# Patient Record
Sex: MALE | Race: WHITE | NOT HISPANIC OR LATINO | URBAN - METROPOLITAN AREA
[De-identification: names, ages, dates, MRNs, and addresses within clinical notes are randomized per-mention and may not be internally consistent; named-entity substitution may affect disease eponyms.]

---

## 2019-10-28 ENCOUNTER — EMERGENCY (EMERGENCY)
Facility: HOSPITAL | Age: 54
LOS: 1 days | Discharge: HOME | End: 2019-10-28
Attending: EMERGENCY MEDICINE | Admitting: EMERGENCY MEDICINE
Payer: COMMERCIAL

## 2019-10-28 VITALS
HEART RATE: 58 BPM | SYSTOLIC BLOOD PRESSURE: 158 MMHG | OXYGEN SATURATION: 98 % | DIASTOLIC BLOOD PRESSURE: 95 MMHG | TEMPERATURE: 98 F | RESPIRATION RATE: 15 BRPM

## 2019-10-28 VITALS
HEART RATE: 64 BPM | WEIGHT: 212.97 LBS | SYSTOLIC BLOOD PRESSURE: 197 MMHG | RESPIRATION RATE: 18 BRPM | DIASTOLIC BLOOD PRESSURE: 110 MMHG | OXYGEN SATURATION: 98 %

## 2019-10-28 LAB
ALBUMIN SERPL ELPH-MCNC: 3.9 G/DL — SIGNIFICANT CHANGE UP (ref 3.3–5)
ALP SERPL-CCNC: 111 U/L — SIGNIFICANT CHANGE UP (ref 40–120)
ALT FLD-CCNC: 131 U/L — HIGH (ref 12–78)
ANION GAP SERPL CALC-SCNC: 6 MMOL/L — SIGNIFICANT CHANGE UP (ref 5–17)
APPEARANCE UR: CLEAR — SIGNIFICANT CHANGE UP
AST SERPL-CCNC: 62 U/L — HIGH (ref 15–37)
BASOPHILS # BLD AUTO: 0.05 K/UL — SIGNIFICANT CHANGE UP (ref 0–0.2)
BASOPHILS NFR BLD AUTO: 0.8 % — SIGNIFICANT CHANGE UP (ref 0–2)
BILIRUB SERPL-MCNC: 0.7 MG/DL — SIGNIFICANT CHANGE UP (ref 0.2–1.2)
BILIRUB UR-MCNC: NEGATIVE — SIGNIFICANT CHANGE UP
BUN SERPL-MCNC: 12 MG/DL — SIGNIFICANT CHANGE UP (ref 7–23)
CALCIUM SERPL-MCNC: 9.3 MG/DL — SIGNIFICANT CHANGE UP (ref 8.5–10.1)
CHLORIDE SERPL-SCNC: 106 MMOL/L — SIGNIFICANT CHANGE UP (ref 96–108)
CO2 SERPL-SCNC: 27 MMOL/L — SIGNIFICANT CHANGE UP (ref 22–31)
COLOR SPEC: SIGNIFICANT CHANGE UP
CREAT SERPL-MCNC: 1.3 MG/DL — SIGNIFICANT CHANGE UP (ref 0.5–1.3)
DIFF PNL FLD: NEGATIVE — SIGNIFICANT CHANGE UP
EOSINOPHIL # BLD AUTO: 0.14 K/UL — SIGNIFICANT CHANGE UP (ref 0–0.5)
EOSINOPHIL NFR BLD AUTO: 2.1 % — SIGNIFICANT CHANGE UP (ref 0–6)
GLUCOSE SERPL-MCNC: 178 MG/DL — HIGH (ref 70–99)
GLUCOSE UR QL: NEGATIVE — SIGNIFICANT CHANGE UP
HCT VFR BLD CALC: 44.7 % — SIGNIFICANT CHANGE UP (ref 39–50)
HGB BLD-MCNC: 15.8 G/DL — SIGNIFICANT CHANGE UP (ref 13–17)
IMM GRANULOCYTES NFR BLD AUTO: 0.8 % — SIGNIFICANT CHANGE UP (ref 0–1.5)
KETONES UR-MCNC: NEGATIVE — SIGNIFICANT CHANGE UP
LEUKOCYTE ESTERASE UR-ACNC: NEGATIVE — SIGNIFICANT CHANGE UP
LYMPHOCYTES # BLD AUTO: 1.19 K/UL — SIGNIFICANT CHANGE UP (ref 1–3.3)
LYMPHOCYTES # BLD AUTO: 18.2 % — SIGNIFICANT CHANGE UP (ref 13–44)
MCHC RBC-ENTMCNC: 29.5 PG — SIGNIFICANT CHANGE UP (ref 27–34)
MCHC RBC-ENTMCNC: 35.3 GM/DL — SIGNIFICANT CHANGE UP (ref 32–36)
MCV RBC AUTO: 83.6 FL — SIGNIFICANT CHANGE UP (ref 80–100)
MONOCYTES # BLD AUTO: 0.35 K/UL — SIGNIFICANT CHANGE UP (ref 0–0.9)
MONOCYTES NFR BLD AUTO: 5.4 % — SIGNIFICANT CHANGE UP (ref 2–14)
NEUTROPHILS # BLD AUTO: 4.76 K/UL — SIGNIFICANT CHANGE UP (ref 1.8–7.4)
NEUTROPHILS NFR BLD AUTO: 72.7 % — SIGNIFICANT CHANGE UP (ref 43–77)
NITRITE UR-MCNC: NEGATIVE — SIGNIFICANT CHANGE UP
NRBC # BLD: 0 /100 WBCS — SIGNIFICANT CHANGE UP (ref 0–0)
PH UR: 6 — SIGNIFICANT CHANGE UP (ref 5–8)
PLATELET # BLD AUTO: 236 K/UL — SIGNIFICANT CHANGE UP (ref 150–400)
POTASSIUM SERPL-MCNC: 3.9 MMOL/L — SIGNIFICANT CHANGE UP (ref 3.5–5.3)
POTASSIUM SERPL-SCNC: 3.9 MMOL/L — SIGNIFICANT CHANGE UP (ref 3.5–5.3)
PROT SERPL-MCNC: 6.8 G/DL — SIGNIFICANT CHANGE UP (ref 6–8.3)
PROT UR-MCNC: NEGATIVE — SIGNIFICANT CHANGE UP
RBC # BLD: 5.35 M/UL — SIGNIFICANT CHANGE UP (ref 4.2–5.8)
RBC # FLD: 11.7 % — SIGNIFICANT CHANGE UP (ref 10.3–14.5)
SODIUM SERPL-SCNC: 139 MMOL/L — SIGNIFICANT CHANGE UP (ref 135–145)
SP GR SPEC: 1 — LOW (ref 1.01–1.02)
UROBILINOGEN FLD QL: NEGATIVE — SIGNIFICANT CHANGE UP
WBC # BLD: 6.54 K/UL — SIGNIFICANT CHANGE UP (ref 3.8–10.5)
WBC # FLD AUTO: 6.54 K/UL — SIGNIFICANT CHANGE UP (ref 3.8–10.5)

## 2019-10-28 PROCEDURE — 80053 COMPREHEN METABOLIC PANEL: CPT

## 2019-10-28 PROCEDURE — 74176 CT ABD & PELVIS W/O CONTRAST: CPT

## 2019-10-28 PROCEDURE — 85027 COMPLETE CBC AUTOMATED: CPT

## 2019-10-28 PROCEDURE — 74176 CT ABD & PELVIS W/O CONTRAST: CPT | Mod: 26

## 2019-10-28 PROCEDURE — 81003 URINALYSIS AUTO W/O SCOPE: CPT

## 2019-10-28 PROCEDURE — 99284 EMERGENCY DEPT VISIT MOD MDM: CPT

## 2019-10-28 PROCEDURE — 96374 THER/PROPH/DIAG INJ IV PUSH: CPT

## 2019-10-28 PROCEDURE — 36415 COLL VENOUS BLD VENIPUNCTURE: CPT

## 2019-10-28 PROCEDURE — 99284 EMERGENCY DEPT VISIT MOD MDM: CPT | Mod: 25

## 2019-10-28 RX ORDER — LIDOCAINE 4 G/100G
1 CREAM TOPICAL
Qty: 7 | Refills: 0
Start: 2019-10-28 | End: 2019-11-03

## 2019-10-28 RX ORDER — KETOROLAC TROMETHAMINE 30 MG/ML
30 SYRINGE (ML) INJECTION ONCE
Refills: 0 | Status: DISCONTINUED | OUTPATIENT
Start: 2019-10-28 | End: 2019-10-28

## 2019-10-28 RX ORDER — IBUPROFEN 200 MG
1 TABLET ORAL
Qty: 15 | Refills: 0
Start: 2019-10-28 | End: 2019-11-01

## 2019-10-28 RX ADMIN — Medication 30 MILLIGRAM(S): at 14:10

## 2019-10-28 RX ADMIN — Medication 30 MILLIGRAM(S): at 14:40

## 2019-10-28 NOTE — ED ADULT NURSE NOTE - OBJECTIVE STATEMENT
53 year old male presents to the ED complaining of back pain. Patient reports lower back pain, right sided. Patient provides history; heavy lifting of a gas tank on Saturday last week, then patient went to the gym on Monday and did normal activities, then patient played volleyball on Tuesday night. Patient reports he was feeling well during all of these activities, no back pain. Patient reports after volleyball on Tuesday night when he returned home he took a shower and began experiencing the back pain. Patient describes intermittent spasms. Patient denies radiation of pain, right lower back only. Patient denies change or incontinence to bowel or bladder. Patient reports difficulty sleeping due to pain. Pain intensifies when he lays down. Patient went to his PMD on Saturday and was prescribed flexeril and meloxicam. Wife also applied lidocaine patch. Patient reports no relief of pain after these interventions. Patient reports continued back spasms in the right lower back. Continues to deny radiation. Denies numbness/tingling. Denies difficulty ambulating. Patient reports the pain still worsens when lying down. Patient appears well, calm and cooperative without anxiety. Patient without diaphoresis or pale color. Patient reports otherwise he feels his normal self - no chest pain, shortness of breath, palpitations, cough, abdominal pain or nausea/vomiting. Denies headache, dizziness or vision changes. Denies upper back or neck tenderness/pain.

## 2019-10-28 NOTE — ED PROVIDER NOTE - ATTENDING CONTRIBUTION TO CARE
Pt is a 54 yo male who presents to the ED with right flank pain.  Pt with no significant past medical history.  Reports that on Tuesday night he noted the pain and that it has progressively worsened since then.  Denies noting any acute trauma.  Pt does reports that he did lift a heavy gas tank on Saturday and that he went to the gym on Monday but he does not recall injuring himself during these events and did not note any acute pain at that time.  Pt further reports that he played in a volleyball game on Tuesday but again denies acute injury.  After volleyball on Tuesday while in the shower pt first noted the pain.  He reports that the pain is constant but that he has periods of sharp spasm like pain as well.  Denies radiation of pain, denies loss of bowel or bladder function.  Pt denies numbness or tingling in his ext.  Denies similar episodes of pain.  Pt further reports that the pain seems to worsen when he lays down and that it has been preventing him from sleeping.  He was seen by his PMD and was prescribed Flexeril and Meloxicam which he has been taking without relief.  Pt also has applied a Lidoderm patch to the region without relief.  Denies prior episodes of pain.  Denies fever, chills, N/V, CP, SOB, abd pain, ext numbness or weakness.  On exam pt lying in bed NAD, NCAT, PERRL, EOMI, heart RRR, lungs CTA, abd soft NT/ND.  NVI to UE and LE bilaterally with no focal deficits noted.  No midline C/T/L TTP no acute step offs or deformities noted.  TTP to right lumbar paraspinal muscle region with increased tone/spasm noted.  No overlying skin changes seen.  Pt with right flank pain appears MSK in nature but no relief from NSAIDs at this time.  Due to colicky nature will obtain screening labs, CT renal stone hunt and check UA.  Will medicate for pain and monitor.  Agree with above plan of care

## 2019-10-28 NOTE — ED PROVIDER NOTE - OBJECTIVE STATEMENT
Pt is a 52 yo male with pmhx of htn c/o of right flank pain for one week. Pt denies any radiation of pain. pain worse when he lays down denies any chest pain sob nvd numbness tingling weakness saddle anesthesia bowel or bladder dysfunction nvd dysuria hematuria. Pt admits to lifting heavy gas tank exercising and playing volleyball. pt went to pcp given meloxicam flexeril and using lidocaine patch. Pt states pain is worse at times.

## 2019-10-28 NOTE — ED PROVIDER NOTE - CARE PROVIDER_API CALL
Parviz Kerr)  Orthopaedic Surgery Surgery  [address] [place] [address]  Phone: [phone]  Fax: [phone]  Follow Up Time:

## 2019-10-28 NOTE — ED ADULT NURSE NOTE - NSIMPLEMENTINTERV_GEN_ALL_ED
Implemented All Universal Safety Interventions:  [place] to call system. Call bell, personal items and telephone within reach. Instruct patient to call for assistance. Room bathroom lighting operational. Non-slip footwear when patient is off stretcher. Physically safe environment: no spills, clutter or unnecessary equipment. Stretcher in lowest position, wheels locked, appropriate side rails in place.

## 2019-10-28 NOTE — ED ADULT NURSE NOTE - CHPI ED NUR SYMPTOMS NEG
no bladder dysfunction/no anorexia/no bowel dysfunction/no constipation/no tingling/no motor function loss/no difficulty bearing weight/no neck tenderness/no fatigue/no numbness

## 2019-10-28 NOTE — ED ADULT NURSE REASSESSMENT NOTE - NS ED NURSE REASSESS COMMENT FT1
Patient lying in bed. Patient reports feeling more comfortably at this time. Resting, pending urine sample collection and CT.

## 2019-10-28 NOTE — ED PROVIDER NOTE - PATIENT PORTAL LINK FT
You can access the FollowMyHealth Patient Portal offered by Health system by registering at the following website: [email or web address]. By joining [facility]’s FollowMyHealth portal, you will also be able to view your health information using other applications (apps) compatible with our system.

## 2022-01-05 ENCOUNTER — FORM ENCOUNTER (OUTPATIENT)
Age: 57
End: 2022-01-05

## 2022-04-21 PROBLEM — Z00.00 ENCOUNTER FOR PREVENTIVE HEALTH EXAMINATION: Status: ACTIVE | Noted: 2022-04-21

## 2022-04-25 ENCOUNTER — APPOINTMENT (OUTPATIENT)
Dept: ORTHOPEDIC SURGERY | Facility: CLINIC | Age: 57
End: 2022-04-25
Payer: COMMERCIAL

## 2022-04-25 VITALS
SYSTOLIC BLOOD PRESSURE: 150 MMHG | BODY MASS INDEX: 28.63 KG/M2 | DIASTOLIC BLOOD PRESSURE: 101 MMHG | HEART RATE: 70 BPM | WEIGHT: 200 LBS | HEIGHT: 70 IN

## 2022-04-25 DIAGNOSIS — Z78.9 OTHER SPECIFIED HEALTH STATUS: ICD-10-CM

## 2022-04-25 DIAGNOSIS — Z83.3 FAMILY HISTORY OF DIABETES MELLITUS: ICD-10-CM

## 2022-04-25 DIAGNOSIS — Z82.49 FAMILY HISTORY OF ISCHEMIC HEART DISEASE AND OTHER DISEASES OF THE CIRCULATORY SYSTEM: ICD-10-CM

## 2022-04-25 PROCEDURE — 99244 OFF/OP CNSLTJ NEW/EST MOD 40: CPT

## 2022-04-25 RX ORDER — ATORVASTATIN CALCIUM 20 MG/1
20 TABLET, FILM COATED ORAL
Qty: 30 | Refills: 0 | Status: ACTIVE | COMMUNITY
Start: 2022-04-11

## 2022-04-25 RX ORDER — SELENIUM SULFIDE 25 MG/ML
2.5 LOTION TOPICAL
Qty: 120 | Refills: 0 | Status: ACTIVE | COMMUNITY
Start: 2021-11-17

## 2022-04-25 RX ORDER — KETOCONAZOLE 20.5 MG/ML
2 SHAMPOO, SUSPENSION TOPICAL
Qty: 120 | Refills: 0 | Status: ACTIVE | COMMUNITY
Start: 2022-01-19

## 2022-04-25 RX ORDER — AMLODIPINE BESYLATE 5 MG/1
5 TABLET ORAL
Qty: 30 | Refills: 0 | Status: ACTIVE | COMMUNITY
Start: 2022-04-12

## 2022-04-25 RX ORDER — KETOCONAZOLE 200 MG/1
200 TABLET ORAL
Qty: 5 | Refills: 0 | Status: ACTIVE | COMMUNITY
Start: 2022-01-19

## 2022-04-25 NOTE — HISTORY OF PRESENT ILLNESS
[FreeTextEntry1] : Is a 56-year-old gentleman who comes in complaining of left knee mass.  It has been slowly growing over the superolateral portion of his left knee for 6+ years.  He does not remember any inciting event.  It bothers him when he bends significantly which he does with his work as an .  He has no obvious radiating signs.  He has had multiple soft tissue masses resected around his body and has a familial history of small lipomas.  He wishes to have this out.  He was sent for consultation by his other orthopedist. [Stable] : stable [___ yrs] : [unfilled] year(s) ago [1] : currently ~his/her~ pain is 1 out of 10 [Direct Pressure] : worsened by direct pressure [Joint Movement] : not exacerbated by joint  movement [None] : No relieving factors are noted

## 2022-04-25 NOTE — REVIEW OF SYSTEMS
[Joint Pain] : no joint pain [Joint Stiffness] : no joint stiffness [Joint Swelling] : no joint swelling [Nl] : Hematologic/Lymphatic

## 2022-04-25 NOTE — DATA REVIEWED
[Imaging Present] : Present [de-identified] : I personally reviewed the following imaging studies both images and report with my own interpretation as below:\par \par X-rays reviewed from February 2022 show no obvious soft tissue abnormalities.  There are minimal degenerative changes seen.\par \par \par MRI scan from January 6, 2022 shows:\par 1. Medial meniscal tear. \par 2. Focal full-thickness cartilage fissure medial femur toward the notch with spurring. Full-thickness cartilage \par fissure posterior non-weightbearing lateral femur with 6 mm septated intraosseous cyst. \par 3. Patella jose raul with lateral subluxation of the patella with thickened plica, patellofemoral cartilage defects and \par joint effusion. \par 4. Patella tendinopathy with peritendinous edema. \par 5. Hamstring and gastrocnemius tendinopathy with medial bursitis posterior to the femur. Insertional fraying \par hamstrings with soft tissue edema.\par 6. There is a circumscribed 15 mm AP dimension x 10 mm transverse dimension x 12 mm in length mixed \par signal intensity lesion within the subcutaneous fat overlying the lateral retinaculum at the level of the \par supracondylar portion of the lateral femoral condyle. No osseous involvement or disruption of the retinaculum. \par Imaging characteristics could favor schwannoma. Additional imaging with contrast or ultrasound suggested for \par further evaluation.

## 2022-04-25 NOTE — PHYSICAL EXAM
[FreeTextEntry1] : On exam the patient stands in good balance.  He is able to move around with full range of motion of bilateral knees.  He has a 2 and half centimeter mass superolaterally just under the skin.  This is mobile.  There is no Tinel's, thrills or bruits it is minimally tender but does bother him with deep palpation.  He has no paresthesias surrounding it.  He has no popliteal or inguinal lymphadenopathy.  He is otherwise neurovascularly intact. [General Appearance - Well-Appearing] : Well appearing [General Appearance - Well Nourished] : well nourished [Oriented To Time, Place, And Person] : Oriented to person, place, and time [Impaired Insight] : Insight and judgment were intact [Affect] : The affect was normal. [Mood] : the mood was normal [Sclera] : the sclera and conjunctiva were normal [Neck Cervical Mass (___cm)] : no neck mass was observed [Heart Rate And Rhythm] : heart rate was normal and rhythm regular [] : No respiratory distress [Abdomen Soft] : Soft [Normal Station and Gait] : gait and station were normal [Tenderness] : tenderness [Masses] : masses [Skin Changes - Describe changes:] : No skin changes noted [Full ROM Unless otherwise noted:] : Full range of motion unless otherwise noted: [LE  Motor Strength Normal unless otherwise noted:] : 5/5 strength in bilateral lower extemities unless otherwise noted. [Normal] : Sensation intact to light touch.

## 2022-04-25 NOTE — CONSULT LETTER
[Dear  ___] : Dear  [unfilled], [FreeTextEntry2] : Nick Donohue MD\par [address] [facility], \par [address]\par [place] [id number] [FreeTextEntry1] : \par After evaluating your patient and reviewing the studies presented we have come to a mutually agreeable plan. \par \par Please see my note below.\par \par Should you have further questions, please feel free to call and discuss the care of your patient.\par \par Thank you for the confidence of your referral.\par \par Sincerely, \par \par Fer Spencer MD \par Chief, Musculoskeletal Oncology \par \par [id number]\par [phone]

## 2022-04-25 NOTE — DISCUSSION/SUMMARY
[Surgical risks reviewed] : Surgical risks reviewed [All Questions Answered] : Patient (and family) had all questions answered to an agreeable level of satisfaction [Interested in Proceeding] : Patient (and family) expressed understanding and interest in proceeding with the plan as outlined [de-identified] : Patient has a 2 cm mass superolaterally in his knee in the subcutaneous tissues.  We discussed resection.  This could be an epidermoid inclusion cyst versus small nerve sheath tumor versus any other type of soft tissue mass.  This could also be a malignancy.  We discussed resection as he does want it out.  He understands the risks and benefits including malignancy continued pain need for further surgery need for further treatment recurrence as well as wound healing issues medical and anesthetic complications.  We will plan for surgery based on whenever they want this.\par \par If imaging was ordered, the patient was told to make an appointment to review findings right after all imaging is completed.\par \par We discussed risks, benefits and alternatives. Rationale of care was reviewed and all questions were answered. Patient (and family) had all questions answered to her degree of the level of satisfaction. Patient (and family) expressed understanding and interest in proceeding with the plan as outlined.\par \par \par \par \par This note was done with a voice recognition transcription software and any typos are related to this rather than medical error. Surgical risks reviewed. Patient (and family) had all questions answered to an agreeable level of satisfaction. Patient (and family) expressed understanding and interest in proceeding with the plan as outlined.  \par

## 2022-05-02 ENCOUNTER — OUTPATIENT (OUTPATIENT)
Dept: OUTPATIENT SERVICES | Facility: HOSPITAL | Age: 57
LOS: 1 days | End: 2022-05-02
Payer: COMMERCIAL

## 2022-05-02 VITALS
WEIGHT: 205.91 LBS | SYSTOLIC BLOOD PRESSURE: 128 MMHG | OXYGEN SATURATION: 97 % | RESPIRATION RATE: 16 BRPM | TEMPERATURE: 98 F | HEIGHT: 70 IN | DIASTOLIC BLOOD PRESSURE: 80 MMHG | HEART RATE: 80 BPM

## 2022-05-02 DIAGNOSIS — I10 ESSENTIAL (PRIMARY) HYPERTENSION: ICD-10-CM

## 2022-05-02 DIAGNOSIS — R22.42 LOCALIZED SWELLING, MASS AND LUMP, LEFT LOWER LIMB: ICD-10-CM

## 2022-05-02 DIAGNOSIS — Z01.818 ENCOUNTER FOR OTHER PREPROCEDURAL EXAMINATION: ICD-10-CM

## 2022-05-02 DIAGNOSIS — G47.33 OBSTRUCTIVE SLEEP APNEA (ADULT) (PEDIATRIC): ICD-10-CM

## 2022-05-02 DIAGNOSIS — Z90.89 ACQUIRED ABSENCE OF OTHER ORGANS: Chronic | ICD-10-CM

## 2022-05-02 DIAGNOSIS — M79.89 OTHER SPECIFIED SOFT TISSUE DISORDERS: ICD-10-CM

## 2022-05-02 LAB
ANION GAP SERPL CALC-SCNC: 14 MMOL/L — SIGNIFICANT CHANGE UP (ref 5–17)
BUN SERPL-MCNC: 15 MG/DL — SIGNIFICANT CHANGE UP (ref 7–23)
CALCIUM SERPL-MCNC: 9.6 MG/DL — SIGNIFICANT CHANGE UP (ref 8.4–10.5)
CHLORIDE SERPL-SCNC: 100 MMOL/L — SIGNIFICANT CHANGE UP (ref 96–108)
CO2 SERPL-SCNC: 24 MMOL/L — SIGNIFICANT CHANGE UP (ref 22–31)
CREAT SERPL-MCNC: 1.24 MG/DL — SIGNIFICANT CHANGE UP (ref 0.5–1.3)
EGFR: 68 ML/MIN/1.73M2 — SIGNIFICANT CHANGE UP
GLUCOSE SERPL-MCNC: 252 MG/DL — HIGH (ref 70–99)
HCT VFR BLD CALC: 46.3 % — SIGNIFICANT CHANGE UP (ref 39–50)
HGB BLD-MCNC: 15.9 G/DL — SIGNIFICANT CHANGE UP (ref 13–17)
MCHC RBC-ENTMCNC: 29.3 PG — SIGNIFICANT CHANGE UP (ref 27–34)
MCHC RBC-ENTMCNC: 34.3 GM/DL — SIGNIFICANT CHANGE UP (ref 32–36)
MCV RBC AUTO: 85.4 FL — SIGNIFICANT CHANGE UP (ref 80–100)
NRBC # BLD: 0 /100 WBCS — SIGNIFICANT CHANGE UP (ref 0–0)
PLATELET # BLD AUTO: 267 K/UL — SIGNIFICANT CHANGE UP (ref 150–400)
POTASSIUM SERPL-MCNC: 4 MMOL/L — SIGNIFICANT CHANGE UP (ref 3.5–5.3)
POTASSIUM SERPL-SCNC: 4 MMOL/L — SIGNIFICANT CHANGE UP (ref 3.5–5.3)
RBC # BLD: 5.42 M/UL — SIGNIFICANT CHANGE UP (ref 4.2–5.8)
RBC # FLD: 12.2 % — SIGNIFICANT CHANGE UP (ref 10.3–14.5)
SODIUM SERPL-SCNC: 138 MMOL/L — SIGNIFICANT CHANGE UP (ref 135–145)
WBC # BLD: 5.76 K/UL — SIGNIFICANT CHANGE UP (ref 3.8–10.5)
WBC # FLD AUTO: 5.76 K/UL — SIGNIFICANT CHANGE UP (ref 3.8–10.5)

## 2022-05-02 PROCEDURE — G0463: CPT

## 2022-05-02 PROCEDURE — 83036 HEMOGLOBIN GLYCOSYLATED A1C: CPT

## 2022-05-02 PROCEDURE — 80048 BASIC METABOLIC PNL TOTAL CA: CPT

## 2022-05-02 PROCEDURE — 85027 COMPLETE CBC AUTOMATED: CPT

## 2022-05-02 RX ORDER — MELOXICAM 15 MG/1
1 TABLET ORAL
Qty: 0 | Refills: 0 | DISCHARGE

## 2022-05-02 RX ORDER — CYCLOBENZAPRINE HYDROCHLORIDE 10 MG/1
1 TABLET, FILM COATED ORAL
Qty: 0 | Refills: 0 | DISCHARGE

## 2022-05-02 RX ORDER — LIDOCAINE 4 G/100G
0 CREAM TOPICAL
Qty: 0 | Refills: 0 | DISCHARGE

## 2022-05-02 NOTE — H&P PST ADULT - PROBLEM SELECTOR PLAN 3
3/12/2020          To Whom It May Concern:    Jeison Rainey is currently under my medical care and may not return to work at this time. She may return to work on [facility] 3/16/2020. If you require additional information please contact our office.
As per [employer] questions, OR Book[employer] notified

## 2022-05-02 NOTE — H&P PST ADULT - HISTORY OF PRESENT ILLNESS
;eft amd rogjt ;eg. rain arms - behind knee  tonsillectomy as a child 57 yo male, PMH HTN, HLD, reports soft tissue masses throughout the body that have been removed at doctor's office (bilateral arms and legs), left lower extremity tumor, next to the knee, has been increasingly growing and due to the location needs to be excised in OR setting. Pt. presents to PST for scheduled Resection of Left Knee Mass on 5/16/22. Pt. denies COVID-19 infection in 2020 through 2022, denies close contact with anyone that has been ill, no fever, cough, dyspnea in past two weeks.    Pt. is scheduled for COVID-19 testing on 5/13 at [facility]

## 2022-05-02 NOTE — H&P PST ADULT - SYMPTOMS
Detail Level: Detailed
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
none

## 2022-05-02 NOTE — H&P PST ADULT - NSICDXPASTMEDICALHX_GEN_ALL_CORE_FT
PAST MEDICAL HISTORY:  No pertinent past medical history      PAST MEDICAL HISTORY:  Hyperlipemia     Hypertension     Pre-diabetes     Skin lumps

## 2022-05-02 NOTE — H&P PST ADULT - PROBLEM SELECTOR PLAN 1
Resection of Left Knee Mass on 5/16/22  Pre-op instructions and Chlorhexidine soap, provided - all questions answered  Labs done at [facility]

## 2022-05-03 LAB
A1C WITH ESTIMATED AVERAGE GLUCOSE RESULT: 8.2 % — HIGH (ref 4–5.6)
ESTIMATED AVERAGE GLUCOSE: 189 MG/DL — HIGH (ref 68–114)

## 2022-05-16 ENCOUNTER — APPOINTMENT (OUTPATIENT)
Dept: ORTHOPEDIC SURGERY | Facility: HOSPITAL | Age: 57
End: 2022-05-16

## 2022-06-02 ENCOUNTER — APPOINTMENT (OUTPATIENT)
Dept: ORTHOPEDIC SURGERY | Facility: CLINIC | Age: 57
End: 2022-06-02

## 2023-05-07 ENCOUNTER — EMERGENCY (EMERGENCY)
Facility: HOSPITAL | Age: 58
LOS: 1 days | Discharge: HOME | End: 2023-05-07
Attending: EMERGENCY MEDICINE | Admitting: EMERGENCY MEDICINE
Payer: COMMERCIAL

## 2023-05-07 VITALS
HEART RATE: 84 BPM | RESPIRATION RATE: 18 BRPM | DIASTOLIC BLOOD PRESSURE: 88 MMHG | OXYGEN SATURATION: 100 % | SYSTOLIC BLOOD PRESSURE: 142 MMHG | TEMPERATURE: 98 F

## 2023-05-07 VITALS
RESPIRATION RATE: 18 BRPM | OXYGEN SATURATION: 99 % | DIASTOLIC BLOOD PRESSURE: 99 MMHG | HEART RATE: 71 BPM | TEMPERATURE: 98 F | SYSTOLIC BLOOD PRESSURE: 173 MMHG | HEIGHT: 70 IN | WEIGHT: 203.93 LBS

## 2023-05-07 DIAGNOSIS — Z90.89 ACQUIRED ABSENCE OF OTHER ORGANS: Chronic | ICD-10-CM

## 2023-05-07 PROBLEM — I10 ESSENTIAL (PRIMARY) HYPERTENSION: Chronic | Status: ACTIVE | Noted: 2022-05-02

## 2023-05-07 PROBLEM — E78.5 HYPERLIPIDEMIA, UNSPECIFIED: Chronic | Status: ACTIVE | Noted: 2022-05-02

## 2023-05-07 PROBLEM — R73.03 PREDIABETES: Chronic | Status: ACTIVE | Noted: 2022-05-02

## 2023-05-07 PROBLEM — R22.9 LOCALIZED SWELLING, MASS AND LUMP, UNSPECIFIED: Chronic | Status: ACTIVE | Noted: 2022-05-02

## 2023-05-07 LAB
ALBUMIN SERPL ELPH-MCNC: 4.2 G/DL — SIGNIFICANT CHANGE UP (ref 3.3–5)
ALP SERPL-CCNC: 134 U/L — HIGH (ref 40–120)
ALT FLD-CCNC: 44 U/L — SIGNIFICANT CHANGE UP (ref 12–78)
AMYLASE P1 CFR SERPL: 33 U/L — SIGNIFICANT CHANGE UP (ref 25–125)
ANION GAP SERPL CALC-SCNC: 4 MMOL/L — LOW (ref 5–17)
APPEARANCE UR: CLEAR — SIGNIFICANT CHANGE UP
AST SERPL-CCNC: 22 U/L — SIGNIFICANT CHANGE UP (ref 15–37)
BASOPHILS # BLD AUTO: 0.05 K/UL — SIGNIFICANT CHANGE UP (ref 0–0.2)
BASOPHILS NFR BLD AUTO: 0.6 % — SIGNIFICANT CHANGE UP (ref 0–2)
BILIRUB SERPL-MCNC: 0.9 MG/DL — SIGNIFICANT CHANGE UP (ref 0.2–1.2)
BILIRUB UR-MCNC: NEGATIVE — SIGNIFICANT CHANGE UP
BUN SERPL-MCNC: 13 MG/DL — SIGNIFICANT CHANGE UP (ref 7–23)
CALCIUM SERPL-MCNC: 9.6 MG/DL — SIGNIFICANT CHANGE UP (ref 8.5–10.1)
CHLORIDE SERPL-SCNC: 106 MMOL/L — SIGNIFICANT CHANGE UP (ref 96–108)
CO2 SERPL-SCNC: 28 MMOL/L — SIGNIFICANT CHANGE UP (ref 22–31)
COLOR SPEC: YELLOW — SIGNIFICANT CHANGE UP
CREAT SERPL-MCNC: 1.5 MG/DL — HIGH (ref 0.5–1.3)
DIFF PNL FLD: ABNORMAL
EGFR: 54 ML/MIN/1.73M2 — LOW
EOSINOPHIL # BLD AUTO: 0.03 K/UL — SIGNIFICANT CHANGE UP (ref 0–0.5)
EOSINOPHIL NFR BLD AUTO: 0.3 % — SIGNIFICANT CHANGE UP (ref 0–6)
GLUCOSE SERPL-MCNC: 274 MG/DL — HIGH (ref 70–99)
GLUCOSE UR QL: 250
HCT VFR BLD CALC: 43.9 % — SIGNIFICANT CHANGE UP (ref 39–50)
HGB BLD-MCNC: 15.2 G/DL — SIGNIFICANT CHANGE UP (ref 13–17)
IMM GRANULOCYTES NFR BLD AUTO: 0.7 % — SIGNIFICANT CHANGE UP (ref 0–0.9)
KETONES UR-MCNC: ABNORMAL
LEUKOCYTE ESTERASE UR-ACNC: NEGATIVE — SIGNIFICANT CHANGE UP
LIDOCAIN IGE QN: 124 U/L — SIGNIFICANT CHANGE UP (ref 73–393)
LYMPHOCYTES # BLD AUTO: 1.02 K/UL — SIGNIFICANT CHANGE UP (ref 1–3.3)
LYMPHOCYTES # BLD AUTO: 11.9 % — LOW (ref 13–44)
MCHC RBC-ENTMCNC: 28.6 PG — SIGNIFICANT CHANGE UP (ref 27–34)
MCHC RBC-ENTMCNC: 34.6 GM/DL — SIGNIFICANT CHANGE UP (ref 32–36)
MCV RBC AUTO: 82.5 FL — SIGNIFICANT CHANGE UP (ref 80–100)
MONOCYTES # BLD AUTO: 0.39 K/UL — SIGNIFICANT CHANGE UP (ref 0–0.9)
MONOCYTES NFR BLD AUTO: 4.5 % — SIGNIFICANT CHANGE UP (ref 2–14)
NEUTROPHILS # BLD AUTO: 7.04 K/UL — SIGNIFICANT CHANGE UP (ref 1.8–7.4)
NEUTROPHILS NFR BLD AUTO: 82 % — HIGH (ref 43–77)
NITRITE UR-MCNC: NEGATIVE — SIGNIFICANT CHANGE UP
NRBC # BLD: 0 /100 WBCS — SIGNIFICANT CHANGE UP (ref 0–0)
PH UR: 6 — SIGNIFICANT CHANGE UP (ref 5–8)
PLATELET # BLD AUTO: 223 K/UL — SIGNIFICANT CHANGE UP (ref 150–400)
POTASSIUM SERPL-MCNC: 4 MMOL/L — SIGNIFICANT CHANGE UP (ref 3.5–5.3)
POTASSIUM SERPL-SCNC: 4 MMOL/L — SIGNIFICANT CHANGE UP (ref 3.5–5.3)
PROT SERPL-MCNC: 7 G/DL — SIGNIFICANT CHANGE UP (ref 6–8.3)
PROT UR-MCNC: NEGATIVE — SIGNIFICANT CHANGE UP
RBC # BLD: 5.32 M/UL — SIGNIFICANT CHANGE UP (ref 4.2–5.8)
RBC # FLD: 12.3 % — SIGNIFICANT CHANGE UP (ref 10.3–14.5)
RBC CASTS # UR COMP ASSIST: ABNORMAL /HPF (ref 0–4)
SODIUM SERPL-SCNC: 138 MMOL/L — SIGNIFICANT CHANGE UP (ref 135–145)
SP GR SPEC: 1.01 — SIGNIFICANT CHANGE UP (ref 1.01–1.02)
UROBILINOGEN FLD QL: NEGATIVE — SIGNIFICANT CHANGE UP
WBC # BLD: 8.59 K/UL — SIGNIFICANT CHANGE UP (ref 3.8–10.5)
WBC # FLD AUTO: 8.59 K/UL — SIGNIFICANT CHANGE UP (ref 3.8–10.5)
WBC UR QL: SIGNIFICANT CHANGE UP

## 2023-05-07 PROCEDURE — 80053 COMPREHEN METABOLIC PANEL: CPT

## 2023-05-07 PROCEDURE — G1004: CPT

## 2023-05-07 PROCEDURE — 85025 COMPLETE CBC W/AUTO DIFF WBC: CPT

## 2023-05-07 PROCEDURE — 36415 COLL VENOUS BLD VENIPUNCTURE: CPT

## 2023-05-07 PROCEDURE — 99284 EMERGENCY DEPT VISIT MOD MDM: CPT | Mod: 25

## 2023-05-07 PROCEDURE — 99285 EMERGENCY DEPT VISIT HI MDM: CPT

## 2023-05-07 PROCEDURE — 87086 URINE CULTURE/COLONY COUNT: CPT

## 2023-05-07 PROCEDURE — 81001 URINALYSIS AUTO W/SCOPE: CPT

## 2023-05-07 PROCEDURE — 74177 CT ABD & PELVIS W/CONTRAST: CPT | Mod: 26,ME

## 2023-05-07 PROCEDURE — 83690 ASSAY OF LIPASE: CPT

## 2023-05-07 PROCEDURE — 82150 ASSAY OF AMYLASE: CPT

## 2023-05-07 PROCEDURE — 96374 THER/PROPH/DIAG INJ IV PUSH: CPT | Mod: XU

## 2023-05-07 PROCEDURE — 74177 CT ABD & PELVIS W/CONTRAST: CPT | Mod: ME

## 2023-05-07 RX ORDER — TAMSULOSIN HYDROCHLORIDE 0.4 MG/1
0.4 CAPSULE ORAL AT BEDTIME
Refills: 0 | Status: DISCONTINUED | OUTPATIENT
Start: 2023-05-07 | End: 2023-05-10

## 2023-05-07 RX ORDER — CEFUROXIME AXETIL 250 MG
500 TABLET ORAL ONCE
Refills: 0 | Status: COMPLETED | OUTPATIENT
Start: 2023-05-07 | End: 2023-05-07

## 2023-05-07 RX ORDER — TAMSULOSIN HYDROCHLORIDE 0.4 MG/1
1 CAPSULE ORAL
Qty: 10 | Refills: 0
Start: 2023-05-07 | End: 2023-05-16

## 2023-05-07 RX ORDER — KETOROLAC TROMETHAMINE 30 MG/ML
30 SYRINGE (ML) INJECTION ONCE
Refills: 0 | Status: DISCONTINUED | OUTPATIENT
Start: 2023-05-07 | End: 2023-05-07

## 2023-05-07 RX ORDER — CEFUROXIME AXETIL 250 MG
1 TABLET ORAL
Qty: 10 | Refills: 0
Start: 2023-05-07 | End: 2023-05-11

## 2023-05-07 RX ORDER — ONDANSETRON 8 MG/1
1 TABLET, FILM COATED ORAL
Qty: 15 | Refills: 0
Start: 2023-05-07 | End: 2023-05-11

## 2023-05-07 RX ORDER — CIPROFLOXACIN LACTATE 400MG/40ML
500 VIAL (ML) INTRAVENOUS ONCE
Refills: 0 | Status: DISCONTINUED | OUTPATIENT
Start: 2023-05-07 | End: 2023-05-07

## 2023-05-07 RX ORDER — SODIUM CHLORIDE 9 MG/ML
1000 INJECTION INTRAMUSCULAR; INTRAVENOUS; SUBCUTANEOUS ONCE
Refills: 0 | Status: COMPLETED | OUTPATIENT
Start: 2023-05-07 | End: 2023-05-07

## 2023-05-07 RX ORDER — IBUPROFEN 200 MG
1 TABLET ORAL
Qty: 28 | Refills: 0
Start: 2023-05-07 | End: 2023-05-13

## 2023-05-07 RX ORDER — CIPROFLOXACIN LACTATE 400MG/40ML
1 VIAL (ML) INTRAVENOUS
Qty: 20 | Refills: 0
Start: 2023-05-07 | End: 2023-05-16

## 2023-05-07 RX ORDER — OXYCODONE AND ACETAMINOPHEN 5; 325 MG/1; MG/1
1 TABLET ORAL
Qty: 8 | Refills: 0
Start: 2023-05-07 | End: 2023-05-08

## 2023-05-07 RX ADMIN — Medication 500 MILLIGRAM(S): at 12:57

## 2023-05-07 RX ADMIN — Medication 30 MILLIGRAM(S): at 11:10

## 2023-05-07 RX ADMIN — Medication 30 MILLIGRAM(S): at 10:37

## 2023-05-07 RX ADMIN — SODIUM CHLORIDE 1000 MILLILITER(S): 9 INJECTION INTRAMUSCULAR; INTRAVENOUS; SUBCUTANEOUS at 10:37

## 2023-05-07 RX ADMIN — TAMSULOSIN HYDROCHLORIDE 0.4 MILLIGRAM(S): 0.4 CAPSULE ORAL at 12:57

## 2023-05-07 NOTE — ED PROVIDER NOTE - OBJECTIVE STATEMENT
56 yo male with PMHX HTN, HLD presents to ED c/o sharp 7/10 RLQ pain that began this AM with associated vomiting/retching x multiple episodes. No current nausea. Ate out yesterday, had calamari and 3 glasses of bourbon.   No Past surgical history.   Last BM yesterday and WNL.   Denies fever, chills.   Denies HA, dizziness or lightheadedness.   Denies CP or SOB, denies palpitations.   +Abd pain, + vomiting, no nausea. Denies diarrhea/constipation.  Normal BM.  Denies urinary symptoms, frequency, urgency, hematuria.   Denies musculoskeletal pain, calf pain.

## 2023-05-07 NOTE — ED PROVIDER NOTE - PATIENT PORTAL LINK FT
You can access the FollowMyHealth Patient Portal offered by [facility] by registering at the following website: [email or web address]. By joining [email or web address]’s FollowMyHealth portal, you will also be able to view your health information using other applications (apps) compatible with our system.

## 2023-05-07 NOTE — ED PROVIDER NOTE - DIFFERENTIAL DIAGNOSIS
Patient presenting to the emergency room with sudden onset of sharp stabbing right lower quadrant pain.  Concern for possible intra-abdominal pathology including but not limited to appendicitis versus colitis versus possible renal stone.  Will obtain screening labs check UA urine culture obtain CT imaging of the abdomen pelvis hydrate and medicate for pain. Differential Diagnosis

## 2023-05-07 NOTE — ED ADULT NURSE NOTE - PAIN RATING/NUMBER SCALE (0-10): ACTIVITY
Reanna Beth was here to discuss upcoming Right Total Hip Replacement .  Symptoms continue to be moderate to severely disabling, exam un-changed from prior.  The risks, benefits and alternatives were discussed in detail again.  she understands, will sign an informed consent and are wishing to proceed.    Greater than 50 % of the time was spent counseling the patient and/or family. The total amount of time spent was 15 minutes.   5 (moderate pain)

## 2023-05-07 NOTE — ED PROVIDER NOTE - NS ED ATTENDING STATEMENT MOD
This was a shared visit with the CHANEL. I reviewed and verified the documentation and independently performed the documented:

## 2023-05-07 NOTE — ED PROVIDER NOTE - CARE PROVIDER_API CALL
Brayden Paige (MD)  Urology  [address], [address]  [place] [address]  Phone: [phone]  Fax: [phone]  Follow Up Time: 1-3 Days

## 2023-05-07 NOTE — ED PROVIDER NOTE - CLINICAL SUMMARY MEDICAL DECISION MAKING FREE TEXT BOX
Patient is a 57-year-old male who presents to the emergency room with a chief complaint of sharp stabbing right lower quadrant pain.  Past medical history of hypertension hyperlipidemia.  He reports that this morning he developed sudden onset of sharp stabbing right lower quadrant pain associated with nausea and 3 episodes of nonbloody vomitus.  He also felt like he needed to have a bowel movement but was unable to.  Did report a normal bowel movement yesterday.  Patient endorses that he did have fried food yesterday and has had similar episodes of nausea vomiting following this but is never experienced abdominal pain like this.  Denies fevers chills chest pain shortness of breath.  Denies dysuria urinary frequency urgency or gross hematuria.  Denies any testicular pain.  Patient with no known abdominal pathology has had a colonoscopy before last colonoscopy 5 years ago.  On exam patient is lying in bed significantly improved after ED therapy.  Normocephalic atraumatic pupils equal round and reactive heart regular rate lungs clear to auscultation abdomen soft at this time with no tenderness to palpation positive bowel sounds.  No overlying skin changes noted.  No evidence of inguinal hernia noted.  Patient presenting to the emergency room with sudden onset of sharp stabbing right lower quadrant pain.  Concern for possible intra-abdominal pathology including but not limited to appendicitis versus colitis versus possible renal stone.  Will obtain screening labs check UA urine culture obtain CT imaging of the abdomen pelvis hydrate and medicate for pain.  Results of labs reviewed patient with a normal white count no evidence of anemia mild renal insufficiency with a receiving hydration.  CT imaging reveals a mild right hydronephrosis due to a 2 mm right UVJ stone.  UA does not appear to be infected.  Patient's pain is controlled at this time.  Stable for discharge home with outpatient urology follow-up and medication.  Results reviewed with patient and wife at bedside all questions answered.

## 2023-05-07 NOTE — ED PROVIDER NOTE - NSFOLLOWUPINSTRUCTIONS_ED_ALL_ED_FT
English    Kidney Stones  The urinary tract with a close-up of a kidney showing kidney stones.  Kidney stones are rock-like masses that form inside of the kidneys. Kidneys are organs that make pee (urine). A kidney stone may move into other parts of the urinary tract, including:  The tubes that connect the kidneys to the bladder (ureters).  The bladder.  The tube that carries urine out of the body (urethra).  Kidney stones can cause very bad pain and can block the flow of pee. The stone usually leaves your body (passes) through your pee. You may need to have a doctor take out the stone.    What are the causes?  Kidney stones may be caused by:  A condition in which certain glands make too much parathyroid hormone (primary hyperparathyroidism).  A buildup of a type of crystals in the bladder made of a chemical called uric acid. The body makes uric acid when you eat certain foods.  Narrowing (stricture) of one or both of the ureters.  A kidney blockage that you were born with.  Past surgery on the kidney or the ureters, such as gastric bypass surgery.  What increases the risk?  You are more likely to develop this condition if:  You have had a kidney stone in the past.  You have a family history of kidney stones.  You do not drink enough water.  You eat a diet that is high in protein, salt (sodium), or sugar.  You are overweight or very overweight (obese).  What are the signs or symptoms?  Symptoms of a kidney stone may include:  Pain in the side of the belly, right below the ribs (flank pain). Pain usually spreads (radiates) to the groin.  Needing to pee often or right away (urgently).  Pain when going pee (urinating).  Blood in your pee (hematuria).  Feeling like you may vomit (nauseous).  Vomiting.  Fever and chills.  How is this treated?  Treatment depends on the size, location, and makeup of the kidney stones. The stones will often pass out of the body through peeing. You may need to:  Drink more fluid to help pass the stone. In some cases, you may be given fluids through an IV tube put into one of your veins at the hospital.  Take medicine for pain.  Make changes in your diet to help keep kidney stones from coming back.  Sometimes, medical procedures are needed to remove a kidney stone. This may involve:  A procedure to break up kidney stones using a beam of light (laser) or shock waves.  Surgery to remove the kidney stones.  Follow these instructions at home:  Medicines    Take over-the-counter and prescription medicines only as told by your doctor.  Ask your doctor if the medicine prescribed to you requires you to avoid driving or using heavy machinery.  Eating and drinking    Drink enough fluid to keep your pee pale yellow. You may be told to drink at least 8–10 glasses of water each day. This will help you pass the stone.  If told by your doctor, change your diet. This may include:  Limiting how much salt you eat.  Eating more fruits and vegetables.  Limiting how much meat, poultry, fish, and eggs you eat.  Follow instructions from your doctor about eating or drinking restrictions.  General instructions    Collect pee samples as told by your doctor. You may need to collect a pee sample:  24 hours after a stone comes out.  8–12 weeks after a stone comes out, and every 6–12 months after that.  Strain your pee every time you pee (urinate), for as long as told. Use the strainer that your doctor recommends.  Do not throw out the stone. Keep it so that it can be tested by your doctor.  Keep all follow-up visits as told by your doctor. This is important. You may need follow-up tests.  How is this prevented?  A comparison of three sample cups showing dark yellow, yellow, and pale yellow urine.  To prevent another kidney stone:  Drink enough fluid to keep your pee pale yellow. This is the best way to prevent kidney stones.  Eat healthy foods.  Avoid certain foods as told by your doctor. You may be told to eat less protein.  Stay at a healthy weight.  Where to find more information  National Kidney Foundation (NKF): www.kidney.org  [employer] (UCF): www.urologyhealth.org  Contact a doctor if:  You have pain that gets worse or does not get better with medicine.  Get help right away if:  You have a fever or chills.  You get very bad pain.  You get new pain in your belly (abdomen).  You pass out (faint).  You cannot pee.  Summary  Kidney stones are rock-like masses that form inside of the kidneys.  Kidney stones can cause very bad pain and can block the flow of pee.  The stones will often pass out of the body through peeing.  Drink enough fluid to keep your pee pale yellow.  This information is not intended to replace advice given to you by your health care provider. Make sure you discuss any questions you have with your health care provider.    Document Revised: 08/22/2022 Document Reviewed: 08/22/2022  Elsevier Patient Education © 2023 [employer]

## 2023-05-07 NOTE — ED PROVIDER NOTE - CROS ED [facility] ALL NEG
[address]        NAME: Buddy Lyle is a 52 y o  female  : 1972    MRN: [id number]  DATE: 2022  TIME: 3:40 PM    Assessment and Plan   SOB (shortness of breath) [R06 02]  1  SOB (shortness of breath)  XR chest pa & lateral   2  Cough  XR chest pa & lateral         Patient Instructions       Follow up with PCP in 3-5 days  Proceed to  ER if symptoms worsen  Chief Complaint     Chief Complaint   Patient presents with   31 [id number] 98     had covid at the end of January of this year  Still coughing and SOB   Covid-19 Vaccine     NEVER HAD ANY COVID VACCINES         History of Present Illness       Pt has taken multiple OTC meds without improvement  Pt has used inhaler, cool mist and multiple meds with some improvement in cough with mucinex that only lasted 4 hours  Pt's rita who had covid 1 week prior to her still has cough  Pt has been using cough drops  Review of Systems   Review of Systems   Constitutional: Positive for fatigue  Negative for appetite change, chills and fever  HENT: Positive for congestion, postnasal drip, sinus pressure and sinus pain (below eyes)  Negative for rhinorrhea, sneezing and sore throat  Eyes: Negative for photophobia, pain, discharge, redness and itching  Respiratory: Positive for cough (was dry now has some productive), chest tightness, shortness of breath (at night at rest and starts day ok and worsens throughout day) and wheezing  Cardiovascular: Negative for chest pain and palpitations  Gastrointestinal: Negative for abdominal pain, diarrhea, nausea and vomiting  Musculoskeletal: Negative for myalgias  Skin: Negative for rash  Neurological: Positive for headaches (from coughing all the time)           Current Medications       Current Outpatient Medications:     albuterol (Ventolin HFA) 90 mcg/act inhaler, Inhale 2 puffs every 6 (six) hours as needed for wheezing, Disp: 18 g, Rfl: 5    esomeprazole (NexIUM) 40 MG capsule, Take 40 mg by mouth 2 (two) times a day, Disp: , Rfl:     lisinopril-hydrochlorothiazide (PRINZIDE,ZESTORETIC) 20-12 5 MG per tablet, Take 2 tablets by mouth daily, Disp: , Rfl:     montelukast (SINGULAIR) 10 mg tablet, TAKE 1 TABLET BY MOUTH DAILY AT BEDTIME, Disp: 30 tablet, Rfl: 3    benzonatate (TESSALON) 200 MG capsule, Take 1 capsule (200 mg total) by mouth 3 (three) times a day as needed for cough (Patient not taking: Reported on 2/28/2020), Disp: 20 capsule, Rfl: 0    cholestyramine (QUESTRAN) 4 g packet, USE 1 PACKET BY MOUTH DAILY, Disp: , Rfl:     dicyclomine (BENTYL) 20 mg tablet, Take 1 tablet (20 mg total) by mouth 4 (four) times a day as needed (abdominal cramps) for up to 7 days, Disp: 20 tablet, Rfl: 0    FEROSUL 325 (65 Fe) MG tablet, Take 1 tablet by mouth 2 (two) times a day (Patient not taking: Reported on 3/14/2022 ), Disp: , Rfl:     fluticasone (FLONASE) 50 mcg/act nasal spray, 1 spray into each nostril daily (Patient not taking: Reported on 8/18/2020), Disp: 1 Bottle, Rfl: 5    levocetirizine (XYZAL) 5 MG tablet, Take 1 tablet (5 mg total) by mouth every evening (Patient not taking: Reported on 8/18/2020), Disp: 30 tablet, Rfl: 5    lisinopril (ZESTRIL) 10 mg tablet, Take 10 mg by mouth daily (Patient not taking: Reported on 3/14/2022 ), Disp: , Rfl:     ondansetron (ZOFRAN-ODT) 4 mg disintegrating tablet, Take 1 tablet (4 mg total) by mouth every 8 (eight) hours as needed for nausea or vomiting for up to 7 days, Disp: 20 tablet, Rfl: 0    pregabalin (LYRICA) 100 mg capsule, Take 100 mg by mouth 3 (three) times a day, Disp: , Rfl:     traZODone (DESYREL) 50 mg tablet, , Disp: , Rfl:     Current Allergies     Allergies as of 03/14/2022    (No Known Allergies)            The following portions of the patient's history were reviewed and updated as appropriate: allergies, current medications, past family history, past medical history, past social history, past surgical history and problem list      Past Medical History:   Diagnosis Date    Hypertension        Past Surgical History:   Procedure Laterality Date    BREAST SURGERY      CARPAL TUNNEL RELEASE       SECTION         No family history on file  Medications have been verified  Objective   Pulse 95   Temp 99 2 °F (37 3 °C)   Resp 18   Ht 5' 5" (1 651 m)   Wt 97 5 kg (215 lb)   SpO2 98%   BMI 35 78 kg/m²   No LMP recorded         Physical Exam     Physical Exam negative...

## 2023-05-07 NOTE — ED ADULT NURSE NOTE - OBJECTIVE STATEMENT
Received pt c/o abd pain radiating,     1212:  pt reports marked improvement in pain. Received pt c/o abd pain radiating to lower back.   Denies dysruia/hematuria.  Abd soft.  Pt apepars uncomfortable.  Respirations evne and unlabored denies cp/sob/palpitations.   Pt calm will continue frequent monitoring. BN   1212:  pt reports marked improvement in pain.  Pt now smiling in stretcher, well appearing.  Pt in no acute distress will continue frequent monioring. BN

## 2023-05-08 LAB
CULTURE RESULTS: SIGNIFICANT CHANGE UP
SPECIMEN SOURCE: SIGNIFICANT CHANGE UP

## 2023-09-18 ENCOUNTER — APPOINTMENT (OUTPATIENT)
Dept: ORTHOPEDIC SURGERY | Facility: CLINIC | Age: 58
End: 2023-09-18
Payer: COMMERCIAL

## 2023-09-18 VITALS — BODY MASS INDEX: 27.77 KG/M2 | HEIGHT: 70 IN | WEIGHT: 194 LBS

## 2023-09-18 DIAGNOSIS — R22.42 LOCALIZED SWELLING, MASS AND LUMP, LEFT LOWER LIMB: ICD-10-CM

## 2023-09-18 PROCEDURE — 76882 US LMTD JT/FCL EVL NVASC XTR: CPT

## 2023-09-18 PROCEDURE — 99214 OFFICE O/P EST MOD 30 MIN: CPT

## 2023-10-18 ENCOUNTER — OUTPATIENT (OUTPATIENT)
Dept: OUTPATIENT SERVICES | Facility: HOSPITAL | Age: 58
LOS: 1 days | End: 2023-10-18
Payer: COMMERCIAL

## 2023-10-18 VITALS
DIASTOLIC BLOOD PRESSURE: 84 MMHG | OXYGEN SATURATION: 100 % | HEIGHT: 69 IN | HEART RATE: 64 BPM | SYSTOLIC BLOOD PRESSURE: 146 MMHG | WEIGHT: 192.9 LBS | RESPIRATION RATE: 16 BRPM | TEMPERATURE: 98 F

## 2023-10-18 DIAGNOSIS — R22.42 LOCALIZED SWELLING, MASS AND LUMP, LEFT LOWER LIMB: ICD-10-CM

## 2023-10-18 DIAGNOSIS — Z98.890 OTHER SPECIFIED POSTPROCEDURAL STATES: Chronic | ICD-10-CM

## 2023-10-18 DIAGNOSIS — Z90.89 ACQUIRED ABSENCE OF OTHER ORGANS: Chronic | ICD-10-CM

## 2023-10-18 DIAGNOSIS — E11.9 TYPE 2 DIABETES MELLITUS WITHOUT COMPLICATIONS: ICD-10-CM

## 2023-10-18 DIAGNOSIS — I10 ESSENTIAL (PRIMARY) HYPERTENSION: ICD-10-CM

## 2023-10-18 DIAGNOSIS — R22.9 LOCALIZED SWELLING, MASS AND LUMP, UNSPECIFIED: ICD-10-CM

## 2023-10-18 DIAGNOSIS — Z86.018 PERSONAL HISTORY OF OTHER BENIGN NEOPLASM: Chronic | ICD-10-CM

## 2023-10-18 LAB
A1C WITH ESTIMATED AVERAGE GLUCOSE RESULT: 6.1 % — HIGH (ref 4–5.6)
A1C WITH ESTIMATED AVERAGE GLUCOSE RESULT: 6.1 % — HIGH (ref 4–5.6)
ANION GAP SERPL CALC-SCNC: 12 MMOL/L — SIGNIFICANT CHANGE UP (ref 7–14)
ANION GAP SERPL CALC-SCNC: 12 MMOL/L — SIGNIFICANT CHANGE UP (ref 7–14)
BUN SERPL-MCNC: 17 MG/DL — SIGNIFICANT CHANGE UP (ref 7–23)
BUN SERPL-MCNC: 17 MG/DL — SIGNIFICANT CHANGE UP (ref 7–23)
CALCIUM SERPL-MCNC: 10.2 MG/DL — SIGNIFICANT CHANGE UP (ref 8.4–10.5)
CALCIUM SERPL-MCNC: 10.2 MG/DL — SIGNIFICANT CHANGE UP (ref 8.4–10.5)
CHLORIDE SERPL-SCNC: 102 MMOL/L — SIGNIFICANT CHANGE UP (ref 98–107)
CHLORIDE SERPL-SCNC: 102 MMOL/L — SIGNIFICANT CHANGE UP (ref 98–107)
CO2 SERPL-SCNC: 26 MMOL/L — SIGNIFICANT CHANGE UP (ref 22–31)
CO2 SERPL-SCNC: 26 MMOL/L — SIGNIFICANT CHANGE UP (ref 22–31)
CREAT SERPL-MCNC: 1.26 MG/DL — SIGNIFICANT CHANGE UP (ref 0.5–1.3)
CREAT SERPL-MCNC: 1.26 MG/DL — SIGNIFICANT CHANGE UP (ref 0.5–1.3)
EGFR: 67 ML/MIN/1.73M2 — SIGNIFICANT CHANGE UP
EGFR: 67 ML/MIN/1.73M2 — SIGNIFICANT CHANGE UP
ESTIMATED AVERAGE GLUCOSE: 128 — SIGNIFICANT CHANGE UP
ESTIMATED AVERAGE GLUCOSE: 128 — SIGNIFICANT CHANGE UP
GLUCOSE SERPL-MCNC: 87 MG/DL — SIGNIFICANT CHANGE UP (ref 70–99)
GLUCOSE SERPL-MCNC: 87 MG/DL — SIGNIFICANT CHANGE UP (ref 70–99)
HCT VFR BLD CALC: 45.7 % — SIGNIFICANT CHANGE UP (ref 39–50)
HCT VFR BLD CALC: 45.7 % — SIGNIFICANT CHANGE UP (ref 39–50)
HGB BLD-MCNC: 15.8 G/DL — SIGNIFICANT CHANGE UP (ref 13–17)
HGB BLD-MCNC: 15.8 G/DL — SIGNIFICANT CHANGE UP (ref 13–17)
MCHC RBC-ENTMCNC: 29.8 PG — SIGNIFICANT CHANGE UP (ref 27–34)
MCHC RBC-ENTMCNC: 29.8 PG — SIGNIFICANT CHANGE UP (ref 27–34)
MCHC RBC-ENTMCNC: 34.6 GM/DL — SIGNIFICANT CHANGE UP (ref 32–36)
MCHC RBC-ENTMCNC: 34.6 GM/DL — SIGNIFICANT CHANGE UP (ref 32–36)
MCV RBC AUTO: 86.2 FL — SIGNIFICANT CHANGE UP (ref 80–100)
MCV RBC AUTO: 86.2 FL — SIGNIFICANT CHANGE UP (ref 80–100)
NRBC # BLD: 0 /100 WBCS — SIGNIFICANT CHANGE UP (ref 0–0)
NRBC # BLD: 0 /100 WBCS — SIGNIFICANT CHANGE UP (ref 0–0)
NRBC # FLD: 0 K/UL — SIGNIFICANT CHANGE UP (ref 0–0)
NRBC # FLD: 0 K/UL — SIGNIFICANT CHANGE UP (ref 0–0)
PLATELET # BLD AUTO: 255 K/UL — SIGNIFICANT CHANGE UP (ref 150–400)
PLATELET # BLD AUTO: 255 K/UL — SIGNIFICANT CHANGE UP (ref 150–400)
POTASSIUM SERPL-MCNC: 4.1 MMOL/L — SIGNIFICANT CHANGE UP (ref 3.5–5.3)
POTASSIUM SERPL-MCNC: 4.1 MMOL/L — SIGNIFICANT CHANGE UP (ref 3.5–5.3)
POTASSIUM SERPL-SCNC: 4.1 MMOL/L — SIGNIFICANT CHANGE UP (ref 3.5–5.3)
POTASSIUM SERPL-SCNC: 4.1 MMOL/L — SIGNIFICANT CHANGE UP (ref 3.5–5.3)
RBC # BLD: 5.3 M/UL — SIGNIFICANT CHANGE UP (ref 4.2–5.8)
RBC # BLD: 5.3 M/UL — SIGNIFICANT CHANGE UP (ref 4.2–5.8)
RBC # FLD: 12.2 % — SIGNIFICANT CHANGE UP (ref 10.3–14.5)
RBC # FLD: 12.2 % — SIGNIFICANT CHANGE UP (ref 10.3–14.5)
SODIUM SERPL-SCNC: 140 MMOL/L — SIGNIFICANT CHANGE UP (ref 135–145)
SODIUM SERPL-SCNC: 140 MMOL/L — SIGNIFICANT CHANGE UP (ref 135–145)
WBC # BLD: 6.53 K/UL — SIGNIFICANT CHANGE UP (ref 3.8–10.5)
WBC # BLD: 6.53 K/UL — SIGNIFICANT CHANGE UP (ref 3.8–10.5)
WBC # FLD AUTO: 6.53 K/UL — SIGNIFICANT CHANGE UP (ref 3.8–10.5)
WBC # FLD AUTO: 6.53 K/UL — SIGNIFICANT CHANGE UP (ref 3.8–10.5)

## 2023-10-18 PROCEDURE — 93010 ELECTROCARDIOGRAM REPORT: CPT

## 2023-10-18 RX ORDER — ATORVASTATIN CALCIUM 80 MG/1
1 TABLET, FILM COATED ORAL
Refills: 0 | DISCHARGE

## 2023-10-18 RX ORDER — CHOLECALCIFEROL (VITAMIN D3) 125 MCG
1 CAPSULE ORAL
Qty: 0 | Refills: 0 | DISCHARGE

## 2023-10-18 RX ORDER — ATORVASTATIN CALCIUM 80 MG/1
1 TABLET, FILM COATED ORAL
Qty: 0 | Refills: 0 | DISCHARGE

## 2023-10-18 RX ORDER — METFORMIN HYDROCHLORIDE 850 MG/1
1 TABLET ORAL
Refills: 0 | DISCHARGE

## 2023-10-18 RX ORDER — AMLODIPINE BESYLATE 2.5 MG/1
1 TABLET ORAL
Qty: 0 | Refills: 0 | DISCHARGE

## 2023-10-18 RX ORDER — AMLODIPINE BESYLATE 2.5 MG/1
1 TABLET ORAL
Refills: 0 | DISCHARGE

## 2023-10-18 RX ORDER — ASPIRIN/CALCIUM CARB/MAGNESIUM 324 MG
0 TABLET ORAL
Qty: 0 | Refills: 0 | DISCHARGE

## 2023-10-18 NOTE — H&P PST ADULT - SKIN/BREAST COMMENTS
Hx swelling/ lump lateral aspect left knee for many years - mobile - pt denies pain but has discomfort with kneeling

## 2023-10-18 NOTE — H&P PST ADULT - NSICDXPASTMEDICALHX_GEN_ALL_CORE_FT
PAST MEDICAL HISTORY:  Hyperlipemia     Hypertension     Pre-diabetes     Skin lumps      PAST MEDICAL HISTORY:  DM (diabetes mellitus)     Hyperlipemia     Hypertension     Localized swelling, mass, or lump     Pre-diabetes     Skin lumps

## 2023-10-18 NOTE — H&P PST ADULT - HISTORY OF PRESENT ILLNESS
Pt  Pt is a 57 yr old male scheduled for Resection Left Knee Mass with Dr Spencer tentatively 10/30/23 - pt hx lump lateral aspect left knee for many years that has become uncomfortable with kneeling - pt hx DM poorly controlled and surgery cancelled last year - pt now A1c 7 and seen by Endo regularly  - hx HTN, HLD

## 2023-10-18 NOTE — H&P PST ADULT - ATTENDING COMMENTS
I have reviewed and agree with note as written above  for resection left knee mass  Risks, benefits and alternatives discussed with patient.  Fer Spencer MD  Musculoskeletal Oncology  [phone]

## 2023-10-18 NOTE — H&P PST ADULT - FUNCTIONAL STATUS
Plays vollyeball, elliptical machine 12" 3x/week, walks 2-3 miles dog walks - can go up 1-2 flights stairs w/o SOB -/4-10 METS METs 5 - Pt plays vollyeball 2x/week,  surfs, [employer] machine 12" 3x/week, walks 2-3 miles dog walks - can go up 1-2 flights stairs w/o SOB -/4-10 METS

## 2023-10-18 NOTE — H&P PST ADULT - OTHER CARE PROVIDERS
Dr. Kip Huggins, cardiology, [phone] Dr. Kip Huggins, cardiology, [phone], Endo Dr Hernandez

## 2023-10-18 NOTE — H&P PST ADULT - SKIN COMMENTS
Small mobile nodule left knee lateral aspect - pt denies pain Small mobile lump left knee lateral aspect - pt denies pain

## 2023-10-27 VITALS
TEMPERATURE: 98 F | WEIGHT: 192.9 LBS | RESPIRATION RATE: 18 BRPM | SYSTOLIC BLOOD PRESSURE: 162 MMHG | OXYGEN SATURATION: 98 % | HEART RATE: 60 BPM | DIASTOLIC BLOOD PRESSURE: 90 MMHG

## 2023-10-27 NOTE — ASU PREOPERATIVE ASSESSMENT, ADULT ([facility]ARK ONLY) - FALL HARM RISK - UNIVERSAL INTERVENTIONS
Bed in lowest position, wheels locked, appropriate side rails in place/Call bell, personal items and telephone in reach/Instruct patient to call for assistance before getting out of bed or chair/Non-slip footwear when patient is out of bed/[place] to call system/Physically safe environment - no spills, clutter or unnecessary equipment/Purposeful Proactive Rounding/Room/bathroom lighting operational, light cord in reach

## 2023-10-27 NOTE — ASU PREOPERATIVE ASSESSMENT, ADULT ([facility] ONLY) - FALL HARM RISK - FALLEN IN PAST
Patient is 87 yo male presenting with right total knee replacement day, complicated with diarrhea/dehydration/ARF secondary to c.diff collitis      1. Pancolitis secondary to c.diff collitis,    continue on po vanco,   completed course of Metronidazole.    advance diet  tolerated   d/c to rehab today.   ID follow up appreciated.       2. S/P right Total knee replacement.  Continue with pain management, DVT proph, and wound care as per Ortho.    Continue with PT/OT.    3. HTN.  Hold BP medication and Monitor BP  4.  Bradycardia with few pauses.  Resolved.   Cardiology follow up appreciated. Needs to f/up with cardiology as a outpatient.   5. Hypotension with acute renal failure.  Improved with IVF.  BP stable.    Nephrology  follow up  appreciated.   6.  Anemia.  due to post-operative blood loss.  Asymptomatic.   Continue with   Iron supplement .    Patient needs repeat blood test in rehab.   7. Urinary retention , passed TOV    Plan of care D/W Ortho PA , RN , SW. No

## 2023-10-29 ENCOUNTER — TRANSCRIPTION ENCOUNTER (OUTPATIENT)
Age: 58
End: 2023-10-29

## 2023-10-30 ENCOUNTER — APPOINTMENT (OUTPATIENT)
Dept: ORTHOPEDIC SURGERY | Facility: AMBULATORY SURGERY CENTER | Age: 58
End: 2023-10-30

## 2023-10-30 ENCOUNTER — TRANSCRIPTION ENCOUNTER (OUTPATIENT)
Age: 58
End: 2023-10-30

## 2023-10-30 ENCOUNTER — OUTPATIENT (OUTPATIENT)
Dept: OUTPATIENT SERVICES | Facility: HOSPITAL | Age: 58
LOS: 1 days | Discharge: HOME | End: 2023-10-30
Payer: COMMERCIAL

## 2023-10-30 VITALS
HEART RATE: 61 BPM | SYSTOLIC BLOOD PRESSURE: 130 MMHG | DIASTOLIC BLOOD PRESSURE: 81 MMHG | TEMPERATURE: 97 F | RESPIRATION RATE: 16 BRPM | OXYGEN SATURATION: 100 %

## 2023-10-30 DIAGNOSIS — R22.42 LOCALIZED SWELLING, MASS AND LUMP, LEFT LOWER LIMB: ICD-10-CM

## 2023-10-30 DIAGNOSIS — Z86.018 PERSONAL HISTORY OF OTHER BENIGN NEOPLASM: Chronic | ICD-10-CM

## 2023-10-30 DIAGNOSIS — Z98.890 OTHER SPECIFIED POSTPROCEDURAL STATES: Chronic | ICD-10-CM

## 2023-10-30 DIAGNOSIS — Z90.89 ACQUIRED ABSENCE OF OTHER ORGANS: Chronic | ICD-10-CM

## 2023-10-30 PROCEDURE — 27329 RESECT THIGH/KNEE TUM < 5 CM: CPT | Mod: LT

## 2023-10-30 PROCEDURE — 88307 TISSUE EXAM BY PATHOLOGIST: CPT | Mod: 26

## 2023-10-30 NOTE — BRIEF OPERATIVE NOTE - NSICDXBRIEFPROCEDURE_GEN_ALL_CORE_FT
PROCEDURES:  Excision, mass, femur 30-Oct-2023 13:28:21 wide sift tissue excision left knee Jerrell Cabral

## 2023-10-30 NOTE — ASU DISCHARGE PLAN (ADULT/PEDIATRIC) - ASU DC SPECIAL INSTRUCTIONSFT
Follow up with Dr Spencer in 14 days. Please call office for appointment. Take over the counter medications as needed for pain. Keep dressing clean, dry, and intact. Rest, ice, and elevate affected extremity. May remove ace wrap and shower tomorrow post op day one.

## 2023-10-30 NOTE — ASU DISCHARGE PLAN (ADULT/PEDIATRIC) - CARE PROVIDER_API CALL
Fer Spencer  Orthopaedic Surgery  [address], [address]  [place] [address]  Phone: [phone]  Fax: [phone]  Follow Up Time:

## 2023-11-01 PROBLEM — R22.9 LOCALIZED SWELLING, MASS AND LUMP, UNSPECIFIED: Chronic | Status: ACTIVE | Noted: 2023-10-18

## 2023-11-01 PROBLEM — E11.9 TYPE 2 DIABETES MELLITUS WITHOUT COMPLICATIONS: Chronic | Status: ACTIVE | Noted: 2023-10-18

## 2023-11-13 ENCOUNTER — APPOINTMENT (OUTPATIENT)
Dept: ORTHOPEDIC SURGERY | Facility: CLINIC | Age: 58
End: 2023-11-13
Payer: COMMERCIAL

## 2023-11-13 VITALS
HEART RATE: 70 BPM | TEMPERATURE: 97.4 F | BODY MASS INDEX: 27.85 KG/M2 | WEIGHT: 188 LBS | OXYGEN SATURATION: 97 % | HEIGHT: 69 IN

## 2023-11-13 DIAGNOSIS — D21.9 BENIGN NEOPLASM OF CONNECTIVE AND OTHER SOFT TISSUE, UNSPECIFIED: ICD-10-CM

## 2023-11-13 PROCEDURE — 99024 POSTOP FOLLOW-UP VISIT: CPT

## 2025-08-09 ENCOUNTER — EMERGENCY (EMERGENCY)
Facility: HOSPITAL | Age: 60
LOS: 1 days | End: 2025-08-09
Attending: STUDENT IN AN ORGANIZED HEALTH CARE EDUCATION/TRAINING PROGRAM | Admitting: STUDENT IN AN ORGANIZED HEALTH CARE EDUCATION/TRAINING PROGRAM
Payer: COMMERCIAL

## 2025-08-09 VITALS
OXYGEN SATURATION: 100 % | TEMPERATURE: 98 F | SYSTOLIC BLOOD PRESSURE: 140 MMHG | RESPIRATION RATE: 18 BRPM | HEART RATE: 97 BPM | DIASTOLIC BLOOD PRESSURE: 90 MMHG

## 2025-08-09 VITALS
HEART RATE: 98 BPM | WEIGHT: 197.98 LBS | TEMPERATURE: 98 F | RESPIRATION RATE: 18 BRPM | SYSTOLIC BLOOD PRESSURE: 148 MMHG | OXYGEN SATURATION: 100 % | HEIGHT: 70 IN | DIASTOLIC BLOOD PRESSURE: 95 MMHG

## 2025-08-09 DIAGNOSIS — Z98.890 OTHER SPECIFIED POSTPROCEDURAL STATES: Chronic | ICD-10-CM

## 2025-08-09 DIAGNOSIS — Z90.89 ACQUIRED ABSENCE OF OTHER ORGANS: Chronic | ICD-10-CM

## 2025-08-09 DIAGNOSIS — Z86.018 PERSONAL HISTORY OF OTHER BENIGN NEOPLASM: Chronic | ICD-10-CM

## 2025-08-09 LAB
ALBUMIN SERPL ELPH-MCNC: 3.8 G/DL — SIGNIFICANT CHANGE UP (ref 3.3–5)
ALP SERPL-CCNC: 91 U/L — SIGNIFICANT CHANGE UP (ref 40–120)
ALT FLD-CCNC: 42 U/L — SIGNIFICANT CHANGE UP (ref 12–78)
ANION GAP SERPL CALC-SCNC: 6 MMOL/L — SIGNIFICANT CHANGE UP (ref 5–17)
APTT BLD: 24.5 SEC — LOW (ref 26.1–36.8)
AST SERPL-CCNC: 23 U/L — SIGNIFICANT CHANGE UP (ref 15–37)
BASOPHILS # BLD AUTO: 0.04 K/UL — SIGNIFICANT CHANGE UP (ref 0–0.2)
BASOPHILS NFR BLD AUTO: 0.5 % — SIGNIFICANT CHANGE UP (ref 0–2)
BILIRUB SERPL-MCNC: 0.8 MG/DL — SIGNIFICANT CHANGE UP (ref 0.2–1.2)
BUN SERPL-MCNC: 19 MG/DL — SIGNIFICANT CHANGE UP (ref 7–23)
CALCIUM SERPL-MCNC: 9.1 MG/DL — SIGNIFICANT CHANGE UP (ref 8.5–10.1)
CHLORIDE SERPL-SCNC: 109 MMOL/L — HIGH (ref 96–108)
CK MB CFR SERPL CALC: 1.5 NG/ML — SIGNIFICANT CHANGE UP (ref 0–3.6)
CO2 SERPL-SCNC: 26 MMOL/L — SIGNIFICANT CHANGE UP (ref 22–31)
CREAT SERPL-MCNC: 1.6 MG/DL — HIGH (ref 0.5–1.3)
EGFR: 49 ML/MIN/1.73M2 — LOW
EGFR: 49 ML/MIN/1.73M2 — LOW
EOSINOPHIL # BLD AUTO: 0.08 K/UL — SIGNIFICANT CHANGE UP (ref 0–0.5)
EOSINOPHIL NFR BLD AUTO: 1 % — SIGNIFICANT CHANGE UP (ref 0–6)
GLUCOSE SERPL-MCNC: 159 MG/DL — HIGH (ref 70–99)
HCT VFR BLD CALC: 43.4 % — SIGNIFICANT CHANGE UP (ref 39–50)
HGB BLD-MCNC: 15.7 G/DL — SIGNIFICANT CHANGE UP (ref 13–17)
IMM GRANULOCYTES # BLD AUTO: 0.03 K/UL — SIGNIFICANT CHANGE UP (ref 0–0.07)
IMM GRANULOCYTES NFR BLD AUTO: 0.4 % — SIGNIFICANT CHANGE UP (ref 0–0.9)
INR BLD: 0.91 RATIO — SIGNIFICANT CHANGE UP (ref 0.85–1.16)
LYMPHOCYTES # BLD AUTO: 1.07 K/UL — SIGNIFICANT CHANGE UP (ref 1–3.3)
LYMPHOCYTES NFR BLD AUTO: 13.3 % — SIGNIFICANT CHANGE UP (ref 13–44)
MCHC RBC-ENTMCNC: 30.1 PG — SIGNIFICANT CHANGE UP (ref 27–34)
MCHC RBC-ENTMCNC: 36.2 G/DL — HIGH (ref 32–36)
MCV RBC AUTO: 83.3 FL — SIGNIFICANT CHANGE UP (ref 80–100)
MONOCYTES # BLD AUTO: 0.42 K/UL — SIGNIFICANT CHANGE UP (ref 0–0.9)
MONOCYTES NFR BLD AUTO: 5.2 % — SIGNIFICANT CHANGE UP (ref 2–14)
NEUTROPHILS # BLD AUTO: 6.4 K/UL — SIGNIFICANT CHANGE UP (ref 1.8–7.4)
NEUTROPHILS NFR BLD AUTO: 79.6 % — HIGH (ref 43–77)
NRBC # BLD AUTO: 0 K/UL — SIGNIFICANT CHANGE UP (ref 0–0)
NRBC # FLD: 0 K/UL — SIGNIFICANT CHANGE UP (ref 0–0)
NRBC BLD AUTO-RTO: 0 /100 WBCS — SIGNIFICANT CHANGE UP (ref 0–0)
PLATELET # BLD AUTO: 207 K/UL — SIGNIFICANT CHANGE UP (ref 150–400)
PMV BLD: 9.4 FL — SIGNIFICANT CHANGE UP (ref 7–13)
POTASSIUM SERPL-MCNC: 3.8 MMOL/L — SIGNIFICANT CHANGE UP (ref 3.5–5.3)
POTASSIUM SERPL-SCNC: 3.8 MMOL/L — SIGNIFICANT CHANGE UP (ref 3.5–5.3)
PROT SERPL-MCNC: 6.4 G/DL — SIGNIFICANT CHANGE UP (ref 6–8.3)
PROTHROM AB SERPL-ACNC: 10.7 SEC — SIGNIFICANT CHANGE UP (ref 9.9–13.4)
RBC # BLD: 5.21 M/UL — SIGNIFICANT CHANGE UP (ref 4.2–5.8)
RBC # FLD: 12.2 % — SIGNIFICANT CHANGE UP (ref 10.3–14.5)
SODIUM SERPL-SCNC: 141 MMOL/L — SIGNIFICANT CHANGE UP (ref 135–145)
TROPONIN I, HIGH SENSITIVITY RESULT: 5.1 NG/L — SIGNIFICANT CHANGE UP
TROPONIN I, HIGH SENSITIVITY RESULT: 8.6 NG/L — SIGNIFICANT CHANGE UP
WBC # BLD: 8.04 K/UL — SIGNIFICANT CHANGE UP (ref 3.8–10.5)
WBC # FLD AUTO: 8.04 K/UL — SIGNIFICANT CHANGE UP (ref 3.8–10.5)

## 2025-08-09 PROCEDURE — 85610 PROTHROMBIN TIME: CPT

## 2025-08-09 PROCEDURE — 36415 COLL VENOUS BLD VENIPUNCTURE: CPT

## 2025-08-09 PROCEDURE — 85025 COMPLETE CBC W/AUTO DIFF WBC: CPT

## 2025-08-09 PROCEDURE — 71045 X-RAY EXAM CHEST 1 VIEW: CPT | Mod: 26

## 2025-08-09 PROCEDURE — 84484 ASSAY OF TROPONIN QUANT: CPT

## 2025-08-09 PROCEDURE — 99284 EMERGENCY DEPT VISIT MOD MDM: CPT

## 2025-08-09 PROCEDURE — 80053 COMPREHEN METABOLIC PANEL: CPT

## 2025-08-09 PROCEDURE — 93005 ELECTROCARDIOGRAM TRACING: CPT

## 2025-08-09 PROCEDURE — 93010 ELECTROCARDIOGRAM REPORT: CPT

## 2025-08-09 PROCEDURE — 82553 CREATINE MB FRACTION: CPT

## 2025-08-09 PROCEDURE — 85730 THROMBOPLASTIN TIME PARTIAL: CPT

## 2025-08-09 PROCEDURE — 71045 X-RAY EXAM CHEST 1 VIEW: CPT

## 2025-08-09 RX ADMIN — Medication 1000 MILLILITER(S): at 15:21

## (undated) DEVICE — DRAPE LAPAROTOMY TRANSVERSE

## (undated) DEVICE — TUBING SUCTION NONCONDUCTIVE 6MM X 12FT

## (undated) DEVICE — GLV 7.5 PROTEXIS (CREAM) NEU-THERA

## (undated) DEVICE — SUT VICRYL 3-0 27" SH UNDYED

## (undated) DEVICE — SOL IRR POUR NS 0.9% 500ML

## (undated) DEVICE — ELCTR ROCKER SWITCH PENCIL BLUE 10FT

## (undated) DEVICE — POSITIONER FOAM EGG CRATE ULNAR 2PCS (PINK)

## (undated) DEVICE — PACK MINOR NO DRAPE

## (undated) DEVICE — ELCTR GROUNDING PAD ADULT COVIDIEN

## (undated) DEVICE — SUT MONOCRYL 4-0 27" PS-2 UNDYED

## (undated) DEVICE — POSITIONER PATIENT SAFETY STRAP 3X60"

## (undated) DEVICE — DRSG STERISTRIPS 0.25 X 3"